# Patient Record
Sex: MALE | URBAN - METROPOLITAN AREA
[De-identification: names, ages, dates, MRNs, and addresses within clinical notes are randomized per-mention and may not be internally consistent; named-entity substitution may affect disease eponyms.]

---

## 2019-02-04 ENCOUNTER — OFFICE VISIT (OUTPATIENT)
Dept: URBAN - METROPOLITAN AREA CLINIC 29 | Facility: CLINIC | Age: 84
End: 2019-02-04
Payer: COMMERCIAL

## 2019-02-04 DIAGNOSIS — E11.9 TYPE 2 DIABETES MELLITUS W/O COMPLICATION: Primary | ICD-10-CM

## 2019-02-04 PROCEDURE — 92014 COMPRE OPH EXAM EST PT 1/>: CPT | Performed by: OPTOMETRIST

## 2019-02-04 RX ORDER — ATORVASTATIN CALCIUM 40 MG/1
40 MG TABLET, FILM COATED ORAL
Qty: 0 | Refills: 0 | Status: INACTIVE
Start: 2019-02-04 | End: 2020-02-21

## 2019-02-04 ASSESSMENT — INTRAOCULAR PRESSURE
OS: 13
OD: 13

## 2019-02-04 NOTE — IMPRESSION/PLAN
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 OU. Plan: Discussed diagnosis in detail with patient. Discussed treatment options with patient. Use of vitamins has shown to improve the effects of ARMD.  Use of Amsler grid was explained. Will continue to observe condition and or symptoms. Discussed risks of progression. Call if [address] worsens.

## 2019-02-04 NOTE — IMPRESSION/PLAN
Impression: Cortical age-related cataract, bilateral: H25.013. Plan: Discussed diagnosis in detail with patient. No treatment is required at this time. Will continue to observe condition and or symptoms. Reassured patient of current condition and treatment. Call if Symptoms occur.

## 2019-02-04 NOTE — IMPRESSION/PLAN
Impression: Type 2 diabetes mellitus w/o complication: G33.9. No Diabetic related eye Disease OU Plan: Discussed diagnosis in detail with patient. Emphasized blood sugar control. Will continue to observe condition and or symptoms. Call if symptoms occur.

## 2020-02-21 ENCOUNTER — OFFICE VISIT (OUTPATIENT)
Dept: URBAN - METROPOLITAN AREA CLINIC 29 | Facility: CLINIC | Age: 85
End: 2020-02-21
Payer: COMMERCIAL

## 2020-02-21 DIAGNOSIS — H25.013 CORTICAL AGE-RELATED CATARACT, BILATERAL: ICD-10-CM

## 2020-02-21 DIAGNOSIS — H35.443 BILATERAL AGE-RELATED RETICULAR DEGENERATION OF RETINAS: ICD-10-CM

## 2020-02-21 PROCEDURE — 92014 COMPRE OPH EXAM EST PT 1/>: CPT | Performed by: OPTOMETRIST

## 2020-02-21 ASSESSMENT — INTRAOCULAR PRESSURE
OS: 13
OD: 13

## 2020-02-21 NOTE — IMPRESSION/PLAN
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 Plan: Discussed diagnosis in detail with patient. Discussed treatment options with patient. Use of vitamins has shown to improve the effects of ARMD.  Use of Amsler grid was explained. Will continue to observe condition and or symptoms. Discussed risks of progression. Call if [address] worsens.

## 2023-04-20 NOTE — IMPRESSION/PLAN
Continued Stay Note  FLORI Craven     Patient Name: Jefry Sabillon  MRN: [id number]  Today's Date: 4/20/2023    Admit Date: 4/19/2023    Plan: plan home with wife   Discharge Plan     Row Name 04/20/23 1345       Plan    Plan plan home with wife    Patient/Family in Agreement with Plan yes    Plan Comments Spoke with patient at bedside, he is sitting up in recliner. Face sheet verified. Pateint lives in a home with his wife and daughter. He is independent of ADLs including driving. He uses cpap and denies additional DME. He has not used HH or rehab services. He has a living will. He sees Dr Simpson as PCP and uses [facility] [place] and [facility]. He denies issues obtaining medications. He has no concerns r/t housing, food, utilities.  Per MD request, CM spoke with [facility] pharmacist to check prices of eliquis and xarelto. Eliquis 30 day supply is $110 and Xarelto 30 day supply is $106. Secure chat sent to Dr Simpson to update. TEAGAN will continue to follow to assist with dc planning.               Discharge Codes    No documentation.                     Flakito Sanon RN     Impression: Type 2 diabetes mellitus without complications: K37.1. No Diabetic related eye Disease OU Plan: Discussed diagnosis in detail with patient. Emphasized blood sugar control. Will continue to observe condition and or symptoms. Call if symptoms occur.